# Patient Record
(demographics unavailable — no encounter records)

---

## 2025-01-16 NOTE — PHYSICAL EXAM
[Chaperone Present] : A chaperone was present in the examining room during all aspects of the physical examination [[id number]] : A chaperone was present during the pelvic exam. [FreeTextEntry2] : Theresa [Absent] : Adnexa(ae): Absent [Normal] : Recto-Vaginal Exam: Normal [de-identified] : Soft, nontender, nondistended, no rebound or guarding, Surgical incision scar noted [Fully active, able to carry on all pre-disease performance without restriction] : Status 0 - Fully active, able to carry on all pre-disease performance without restriction

## 2025-01-16 NOTE — REASON FOR VISIT
[FreeTextEntry1] : Patient with history of carcinosarcoma of the endometrium, patient is here for follow-up and surveillance [Family Member] : family member

## 2025-01-16 NOTE — HISTORY OF PRESENT ILLNESS
[FreeTextEntry1] : 61-year-old female, status post total abdominal surgery with BSO with final pathology revealing stage IB Leiomyosarcoma. Patient underwent surgery June 25, 2024.  Patient is here for surveillance today and reports no complaints.  Patient required no adjuvant treatment postoperatively.

## 2025-01-16 NOTE — ASSESSMENT
[FreeTextEntry1] : 61-year-old female, history of stage Ib uterine leiomyosarcoma, status post total abdominal hysterectomy with BSO performed 6/25/24, Patient required no adjuvant treatment. On today's visit, patient has no evidence of recurrent disease. 30 minutes was spent with the patient during this visit. Patient to follow-up in 3 to 6 months

## 2025-03-03 NOTE — HISTORY OF PRESENT ILLNESS
[FreeTextEntry1] : 61 y/o female here today for initial urological workup.  The patient states that she has occasional back pain, mostly on the left side. PSH of Total Abdominal Hysterectomy in June & HTN. Nocturia X3/4, minimal hematuria, denies dysuria, or any other urinary issues YONIS Occasionally eating spicy. Adequately hydrating  Tobacco use: denies Last creatinine:  mg/dL Last UCx: Uro meds: none

## 2025-03-03 NOTE — PHYSICAL EXAM
[Normal Appearance] : normal appearance [Well Groomed] : well groomed [General Appearance - In No Acute Distress] : no acute distress [Edema] : no peripheral edema [Respiration, Rhythm And Depth] : normal respiratory rhythm and effort [Exaggerated Use Of Accessory Muscles For Inspiration] : no accessory muscle use [Abdomen Soft] : soft [Abdomen Tenderness] : non-tender [Costovertebral Angle Tenderness] : no ~M costovertebral angle tenderness [Urinary Bladder Findings] : the bladder was normal on palpation [Normal Station and Gait] : the gait and station were normal for the patient's age [] : no rash [No Focal Deficits] : no focal deficits [Oriented To Time, Place, And Person] : oriented to person, place, and time [Affect] : the affect was normal [Mood] : the mood was normal [No Palpable Adenopathy] : no palpable adenopathy [de-identified] : Kidney percussion test negative bilaterally, no pain reported on bi-manual palpation bilaterally, no pain on superficial and deep palpation on the iliac fossa bilaterally and on the ureteral points

## 2025-03-03 NOTE — ASSESSMENT
[FreeTextEntry1] : 63 y/o female here today for initial urological workup.  The patient states that she has occasional back pain, mostly on the left side. PSH of Total Abdominal Hysterectomy in June & HTN. Nocturia X3/4, minimal hematuria, denies dysuria, or any other urinary issues YONIS Occasionally eating spicy. Adequately hydrating  Tobacco use: denies Last creatinine:  mg/dL Last UCx: Uro meds: none   02/04/2025 - CT scan 1.7 cm left renal lesion, probably small cyst or solid lesion.  0.7 cm stone in the right kidney, mid nikolay  Kidney percussion test negative bilaterally, no pain reported on bi-manual palpation bilaterally, no pain on superficial and deep palpation on the iliac fossa bilaterally and on the ureteral points   Blood draw for Renal Panel Collecting urine for UA and Culture  Will F/U in case of positive results  In case negative, will F/U with CT scan w/wo contrast of abdomen and pelvis.

## 2025-03-03 NOTE — HISTORY OF PRESENT ILLNESS
[FreeTextEntry1] : 63 y/o female here today for initial urological workup.  The patient states that she has occasional back pain, mostly on the left side. PSH of Total Abdominal Hysterectomy in June & HTN. Nocturia X3/4, minimal hematuria, denies dysuria, or any other urinary issues YONIS Occasionally eating spicy. Adequately hydrating  Tobacco use: denies Last creatinine:  mg/dL Last UCx: Uro meds: none

## 2025-03-03 NOTE — PHYSICAL EXAM
[Normal Appearance] : normal appearance [Well Groomed] : well groomed [General Appearance - In No Acute Distress] : no acute distress [Edema] : no peripheral edema [Respiration, Rhythm And Depth] : normal respiratory rhythm and effort [Exaggerated Use Of Accessory Muscles For Inspiration] : no accessory muscle use [Abdomen Soft] : soft [Abdomen Tenderness] : non-tender [Costovertebral Angle Tenderness] : no ~M costovertebral angle tenderness [Urinary Bladder Findings] : the bladder was normal on palpation [Normal Station and Gait] : the gait and station were normal for the patient's age [] : no rash [No Focal Deficits] : no focal deficits [Oriented To Time, Place, And Person] : oriented to person, place, and time [Affect] : the affect was normal [Mood] : the mood was normal [No Palpable Adenopathy] : no palpable adenopathy [de-identified] : Kidney percussion test negative bilaterally, no pain reported on bi-manual palpation bilaterally, no pain on superficial and deep palpation on the iliac fossa bilaterally and on the ureteral points

## 2025-03-03 NOTE — ASSESSMENT
[FreeTextEntry1] : 61 y/o female here today for initial urological workup.  The patient states that she has occasional back pain, mostly on the left side. PSH of Total Abdominal Hysterectomy in June & HTN. Nocturia X3/4, minimal hematuria, denies dysuria, or any other urinary issues YONIS Occasionally eating spicy. Adequately hydrating  Tobacco use: denies Last creatinine:  mg/dL Last UCx: Uro meds: none   02/04/2025 - CT scan 1.7 cm left renal lesion, probably small cyst or solid lesion.  0.7 cm stone in the right kidney, mid nikolay  Kidney percussion test negative bilaterally, no pain reported on bi-manual palpation bilaterally, no pain on superficial and deep palpation on the iliac fossa bilaterally and on the ureteral points   Blood draw for Renal Panel Collecting urine for UA and Culture  Will F/U in case of positive results  In case negative, will F/U with CT scan w/wo contrast of abdomen and pelvis. No

## 2025-03-22 NOTE — ASSESSMENT
[FreeTextEntry1] : The patient presents with the recent discovery of a new liver lesion. She has a recent history of uterine leiomyosarcoma resected 10 months ago. The liver lesion is most likely a metastasis. I will present the findings at the multidisciplinary liver conference and further workup/management will be forthcoming with resection of the liver being the most likely treatment. I discussed this with patient and the visit was over 45 minutes in length.

## 2025-03-22 NOTE — PHYSICAL EXAM
[JVD] : no jugular venous distention  [Respiratory Effort] : normal respiratory effort [Normal Rate and Rhythm] : normal rate and rhythm [Abdominal Masses] : No abdominal masses [No HSM] : no hepatosplenomegaly [No Rash or Lesion] : No rash or lesion [Alert] : alert [Oriented to Person] : oriented to person [Oriented to Place] : oriented to place [Oriented to Time] : oriented to time [Calm] : calm [de-identified] : WD/Wn woman in NAD [de-identified] : NCAT no scleral icterus [de-identified] : Midline surgical scar healed. [de-identified] : FROM no deformities.

## 2025-03-22 NOTE — HISTORY OF PRESENT ILLNESS
Peripheral IV    Performed by: Alon Durand MD  Authorized by: Alon Durand MD    Size:  20 G  Laterality:  Left  Location:  Forearm  Local Anesthetic:  None  Site Prep:  Alcohol  Technique:  Anatomical landmarks  Attempts:  1  Securement: Tape and Transparent dressing  Start Time: 11/19/2023 10:40 AM       [de-identified] : The patient presents having has a radical hysterectomy with tumor debulking, omentectomy and lymph node dissection for a high grade leiomyosarcoma. in July 2024. Recent imaging reveals a new liver lesion consistent with metastasis. She has no specific complaints.

## 2025-07-24 NOTE — PHYSICAL EXAM
[Chaperoned Physical Exam] : A chaperone was present in the examining room during all aspects of the physical examination. [MA] : MA [FreeTextEntry2] : Theresa [Absent] : Adnexa(ae): Absent [Normal] : Anus and perineum: Normal sphincter tone, no masses, no prolapse. [de-identified] : Soft, nontender, nondistended, no rebound or guarding [Fully active, able to carry on all pre-disease performance without restriction] : Status 0 - Fully active, able to carry on all pre-disease performance without restriction

## 2025-07-24 NOTE — REASON FOR VISIT
[FreeTextEntry1] : Patient with history of carcinosarcoma of the endometrium, patient is here for follow-up and surveillance

## 2025-07-24 NOTE — HISTORY OF PRESENT ILLNESS
[FreeTextEntry1] : 62-year-old female, status post total abdominal surgery with BSO with final pathology revealing stage IB Leiomyosarcoma. In July 2024. Recent imaging reveals a new liver lesion consistent with metastasis, Patient is receiving chemotherapy with good response to treatment and tolerating treatment well.  Patient is here today for surveillance.

## 2025-07-24 NOTE — ASSESSMENT
[FreeTextEntry1] : 62-year-old female, with a history of stage Ib leiomyosarcoma of the uterus, patient underwent surgery in July 2024, patient subsequently developed recurrence/metastatic disease with liver lesion, patient is undergoing chemotherapy with good response to treatment, patient tolerating the treatment well.  Patient reports no complaint today no vaginal bleeding or discharge.  Patient reports good appetite and good weight gain. Patient to continue with chemotherapy as scheduled and to continue follow-up with me every 3 months.  Spent 35 minutes with the patient during this visit.